# Patient Record
Sex: MALE | Race: BLACK OR AFRICAN AMERICAN | Employment: OTHER | URBAN - METROPOLITAN AREA
[De-identification: names, ages, dates, MRNs, and addresses within clinical notes are randomized per-mention and may not be internally consistent; named-entity substitution may affect disease eponyms.]

---

## 2021-05-07 ENCOUNTER — HOSPITAL ENCOUNTER (EMERGENCY)
Age: 71
Discharge: HOME | End: 2021-05-08
Attending: EMERGENCY MEDICINE
Payer: MEDICARE

## 2021-05-07 DIAGNOSIS — I10 ACCELERATED HYPERTENSION: ICD-10-CM

## 2021-05-07 DIAGNOSIS — E87.6 HYPOKALEMIA: Primary | ICD-10-CM

## 2021-05-07 LAB
ALBUMIN SERPL-MCNC: 3.8 G/DL (ref 3.4–5)
ALBUMIN/GLOB SERPL: 1.1 {RATIO} (ref 0.8–1.7)
ALP SERPL-CCNC: 95 U/L (ref 45–117)
ALT SERPL-CCNC: 48 U/L (ref 16–61)
ANION GAP SERPL CALC-SCNC: 5 MMOL/L (ref 3–18)
ANION GAP SERPL CALC-SCNC: 6 MMOL/L (ref 3–18)
AST SERPL-CCNC: 39 U/L (ref 10–38)
BASOPHILS # BLD: 0 K/UL (ref 0–0.1)
BASOPHILS NFR BLD: 1 % (ref 0–2)
BILIRUB SERPL-MCNC: 0.5 MG/DL (ref 0.2–1)
BUN SERPL-MCNC: 19 MG/DL (ref 7–18)
BUN SERPL-MCNC: 20 MG/DL (ref 7–18)
BUN/CREAT SERPL: 13 (ref 12–20)
BUN/CREAT SERPL: 15 (ref 12–20)
CALCIUM SERPL-MCNC: 9 MG/DL (ref 8.5–10.1)
CALCIUM SERPL-MCNC: 9.2 MG/DL (ref 8.5–10.1)
CHLORIDE SERPL-SCNC: 100 MMOL/L (ref 100–111)
CHLORIDE SERPL-SCNC: 100 MMOL/L (ref 100–111)
CO2 SERPL-SCNC: 32 MMOL/L (ref 21–32)
CO2 SERPL-SCNC: 35 MMOL/L (ref 21–32)
CREAT SERPL-MCNC: 1.3 MG/DL (ref 0.6–1.3)
CREAT SERPL-MCNC: 1.5 MG/DL (ref 0.6–1.3)
DIFFERENTIAL METHOD BLD: ABNORMAL
EOSINOPHIL # BLD: 0.1 K/UL (ref 0–0.4)
EOSINOPHIL NFR BLD: 4 % (ref 0–5)
ERYTHROCYTE [DISTWIDTH] IN BLOOD BY AUTOMATED COUNT: 13.2 % (ref 11.6–14.5)
GLOBULIN SER CALC-MCNC: 3.4 G/DL (ref 2–4)
GLUCOSE SERPL-MCNC: 134 MG/DL (ref 74–99)
GLUCOSE SERPL-MCNC: 213 MG/DL (ref 74–99)
HCT VFR BLD AUTO: 42.1 % (ref 36–48)
HGB BLD-MCNC: 13.9 G/DL (ref 13–16)
LYMPHOCYTES # BLD: 1.6 K/UL (ref 0.9–3.6)
LYMPHOCYTES NFR BLD: 52 % (ref 21–52)
MAGNESIUM SERPL-MCNC: 2.1 MG/DL (ref 1.6–2.6)
MCH RBC QN AUTO: 28.1 PG (ref 24–34)
MCHC RBC AUTO-ENTMCNC: 33 G/DL (ref 31–37)
MCV RBC AUTO: 85.1 FL (ref 74–97)
MONOCYTES # BLD: 0.4 K/UL (ref 0.05–1.2)
MONOCYTES NFR BLD: 14 % (ref 3–10)
NEUTS SEG # BLD: 0.9 K/UL (ref 1.8–8)
NEUTS SEG NFR BLD: 29 % (ref 40–73)
PLATELET # BLD AUTO: 122 K/UL (ref 135–420)
PMV BLD AUTO: 12.2 FL (ref 9.2–11.8)
POTASSIUM SERPL-SCNC: 2.6 MMOL/L (ref 3.5–5.5)
POTASSIUM SERPL-SCNC: 2.8 MMOL/L (ref 3.5–5.5)
PROT SERPL-MCNC: 7.2 G/DL (ref 6.4–8.2)
RBC # BLD AUTO: 4.95 M/UL (ref 4.35–5.65)
SODIUM SERPL-SCNC: 138 MMOL/L (ref 136–145)
SODIUM SERPL-SCNC: 140 MMOL/L (ref 136–145)
WBC # BLD AUTO: 3 K/UL (ref 4.6–13.2)

## 2021-05-07 PROCEDURE — 80053 COMPREHEN METABOLIC PANEL: CPT

## 2021-05-07 PROCEDURE — 74011250636 HC RX REV CODE- 250/636: Performed by: EMERGENCY MEDICINE

## 2021-05-07 PROCEDURE — 80048 BASIC METABOLIC PNL TOTAL CA: CPT

## 2021-05-07 PROCEDURE — 85025 COMPLETE CBC W/AUTO DIFF WBC: CPT

## 2021-05-07 PROCEDURE — 96365 THER/PROPH/DIAG IV INF INIT: CPT

## 2021-05-07 PROCEDURE — 83735 ASSAY OF MAGNESIUM: CPT

## 2021-05-07 PROCEDURE — 74011250637 HC RX REV CODE- 250/637: Performed by: EMERGENCY MEDICINE

## 2021-05-07 PROCEDURE — 99285 EMERGENCY DEPT VISIT HI MDM: CPT

## 2021-05-07 RX ORDER — CARVEDILOL 12.5 MG/1
TABLET ORAL 2 TIMES DAILY WITH MEALS
COMMUNITY

## 2021-05-07 RX ORDER — CHLORTHALIDONE 25 MG/1
TABLET ORAL DAILY
COMMUNITY
End: 2021-05-08

## 2021-05-07 RX ORDER — HYDRALAZINE HYDROCHLORIDE 10 MG/1
10 TABLET, FILM COATED ORAL
Status: COMPLETED | OUTPATIENT
Start: 2021-05-07 | End: 2021-05-07

## 2021-05-07 RX ORDER — LOSARTAN POTASSIUM 100 MG/1
100 TABLET ORAL DAILY
COMMUNITY

## 2021-05-07 RX ORDER — POTASSIUM CHLORIDE 20 MEQ/1
40 TABLET, EXTENDED RELEASE ORAL
Status: COMPLETED | OUTPATIENT
Start: 2021-05-07 | End: 2021-05-07

## 2021-05-07 RX ORDER — SIMVASTATIN 10 MG/1
TABLET, FILM COATED ORAL
COMMUNITY

## 2021-05-07 RX ORDER — GLIPIZIDE 5 MG/1
TABLET ORAL 2 TIMES DAILY
COMMUNITY

## 2021-05-07 RX ORDER — POTASSIUM CHLORIDE 750 MG/1
TABLET, FILM COATED, EXTENDED RELEASE ORAL
COMMUNITY

## 2021-05-07 RX ORDER — POTASSIUM CHLORIDE 20 MEQ/1
20 TABLET, EXTENDED RELEASE ORAL
Status: DISCONTINUED | OUTPATIENT
Start: 2021-05-07 | End: 2021-05-08

## 2021-05-07 RX ORDER — POTASSIUM CHLORIDE 7.45 MG/ML
10 INJECTION INTRAVENOUS
Status: COMPLETED | OUTPATIENT
Start: 2021-05-07 | End: 2021-05-07

## 2021-05-07 RX ADMIN — POTASSIUM CHLORIDE 10 MEQ: 10 INJECTION, SOLUTION INTRAVENOUS at 21:04

## 2021-05-07 RX ADMIN — POTASSIUM CHLORIDE 40 MEQ: 1500 TABLET, EXTENDED RELEASE ORAL at 21:03

## 2021-05-07 RX ADMIN — HYDRALAZINE HYDROCHLORIDE 10 MG: 10 TABLET, FILM COATED ORAL at 20:18

## 2021-05-07 NOTE — ED PROVIDER NOTES
[address]  EMERGENCY DEPARTMENT HISTORY AND PHYSICAL EXAM    7:32 PM    Date: 5/7/2021  Patient Name: Jessica Roy    History of Presenting Illness     Chief Complaint   Patient presents with    Abnormal Lab Results       History Provided By: Patient  Location/Duration/Severity/Modifying factors   77-year-old male presenting for abnormal labs. Patient reports he had routine labs drawn today which revealed a low potassium. He reports he had 2 tests done the initial test showed a potassium of 2.5. He went home took his oral potassium and he ate a banana he returns for repeat and his potassium is up to 2.7. He was then referred to our ER. Patient denies any symptoms really at all no numbness tingling weakness. He denies any specific issues like this in the past.  Did not have any particular symptoms when he went for the blood draw today. Reports he feels well no chest pain or shortness of breath would like to not be hospitalized. PCP: Lakeisha, MD Rodriguez    Current Outpatient Medications   Medication Sig Dispense Refill    hydrALAZINE (APRESOLINE) 25 mg tablet Take 1 Tab by mouth daily for 2 days. 2 Tab 0    losartan (COZAAR) 100 mg tablet Take 100 mg by mouth daily.  simvastatin (ZOCOR) 10 mg tablet Take  by mouth nightly.  potassium chloride SR (KLOR-CON 10) 10 mEq tablet Take  by mouth.  carvediloL (COREG) 12.5 mg tablet Take  by mouth two (2) times daily (with meals).  glipiZIDE (GLUCOTROL) 5 mg tablet Take  by mouth two (2) times a day.  SITagliptin (Januvia) 50 mg tablet Take 50 mg by mouth daily. Past History     Past Medical History:  Past Medical History:   Diagnosis Date    Diabetes ([address]     Hypertension        Past Surgical History:  History reviewed. No pertinent surgical history. Family History:  History reviewed. No pertinent family history.     Social History:  Social History     Tobacco Use    Smoking status: Never Smoker  Smokeless tobacco: Never Used   Substance Use Topics    Alcohol use: Yes    Drug use: Not on file       Allergies:  No Known Allergies    I reviewed and confirmed the above information with patient and updated as necessary. Review of Systems     Review of Systems   Constitutional: Negative for chills and fever. HENT: Negative for congestion, rhinorrhea, sinus pressure and sneezing. Eyes: Negative for visual disturbance. Respiratory: Negative for cough and shortness of breath. Cardiovascular: Negative for chest pain. Gastrointestinal: Negative for abdominal pain, diarrhea, nausea and vomiting. Genitourinary: Negative for dysuria, frequency and urgency. Musculoskeletal: Negative for back pain and neck pain. Skin: Negative for rash. Neurological: Negative for syncope, numbness and headaches. Physical Exam     Visit Vitals  BP (!) 191/146   Pulse (!) 58   Temp 98 °F (36.7 °C)   Resp 15   Ht 5' 10\" (1.778 m)   Wt 86.2 kg (190 lb)   SpO2 100%   BMI 27.26 kg/m²       Physical Exam  Constitutional:       General: He is not in acute distress. Appearance: Normal appearance. He is normal weight. He is not ill-appearing or toxic-appearing. HENT:      Head: Normocephalic and atraumatic. Right Ear: External ear normal.      Left Ear: External ear normal.      Nose: Nose normal.      Mouth/Throat:      Mouth: Mucous membranes are moist.      Pharynx: No oropharyngeal exudate or posterior oropharyngeal erythema. Eyes:      Conjunctiva/sclera: Conjunctivae normal.      Pupils: Pupils are equal, round, and reactive to light. Neck:      Musculoskeletal: Normal range of motion and neck supple. Cardiovascular:      Rate and Rhythm: Normal rate and regular rhythm. Pulses: Normal pulses. Heart sounds: Normal heart sounds. No murmur. No friction rub. Pulmonary:      Effort: Pulmonary effort is normal.      Breath sounds: Normal breath sounds. No wheezing, rhonchi or rales. Abdominal:      General: Abdomen is flat. Tenderness: There is no abdominal tenderness. There is no guarding or rebound. Musculoskeletal: Normal range of motion. General: No swelling or tenderness. Right lower leg: No edema. Left lower leg: No edema. Skin:     General: Skin is warm and dry. Capillary Refill: Capillary refill takes less than 2 seconds. Neurological:      General: No focal deficit present. Mental Status: He is alert. Motor: No weakness. Diagnostic Study Results     Labs -  Recent Results (from the past 24 hour(s))   CBC WITH AUTOMATED DIFF    Collection Time: 05/07/21  7:40 PM   Result Value Ref Range    WBC 3.0 (L) 4.6 - 13.2 K/uL    RBC 4.95 4.35 - 5.65 M/uL    HGB 13.9 13.0 - 16.0 g/dL    HCT 42.1 36.0 - 48.0 %    MCV 85.1 74.0 - 97.0 FL    MCH 28.1 24.0 - 34.0 PG    MCHC 33.0 31.0 - 37.0 g/dL    RDW 13.2 11.6 - 14.5 %    PLATELET 932 (L) 482 - 420 K/uL    MPV 12.2 (H) 9.2 - 11.8 FL    NEUTROPHILS 29 (L) 40 - 73 %    LYMPHOCYTES 52 21 - 52 %    MONOCYTES 14 (H) 3 - 10 %    EOSINOPHILS 4 0 - 5 %    BASOPHILS 1 0 - 2 %    ABS. NEUTROPHILS 0.9 (L) 1.8 - 8.0 K/UL    ABS. LYMPHOCYTES 1.6 0.9 - 3.6 K/UL    ABS. MONOCYTES 0.4 0.05 - 1.2 K/UL    ABS. EOSINOPHILS 0.1 0.0 - 0.4 K/UL    ABS.  BASOPHILS 0.0 0.0 - 0.1 K/UL    DF AUTOMATED     MAGNESIUM    Collection Time: 05/07/21  7:40 PM   Result Value Ref Range    Magnesium 2.1 1.6 - 2.6 mg/dL   METABOLIC PANEL, COMPREHENSIVE    Collection Time: 05/07/21  7:40 PM   Result Value Ref Range    Sodium 140 136 - 145 mmol/L    Potassium 2.8 (LL) 3.5 - 5.5 mmol/L    Chloride 100 100 - 111 mmol/L    CO2 35 (H) 21 - 32 mmol/L    Anion gap 5 3.0 - 18 mmol/L    Glucose 213 (H) 74 - 99 mg/dL    BUN 20 (H) 7.0 - 18 MG/DL    Creatinine 1.50 (H) 0.6 - 1.3 MG/DL    BUN/Creatinine ratio 13 12 - 20      GFR est AA 56 (L) >60 ml/min/1.73m2    GFR est non-AA 46 (L) >60 ml/min/1.73m2    Calcium 9.2 8.5 - 10.1 MG/DL Bilirubin, total 0.5 0.2 - 1.0 MG/DL    ALT (SGPT) 48 16 - 61 U/L    AST (SGOT) 39 (H) 10 - 38 U/L    Alk. phosphatase 95 45 - 117 U/L    Protein, total 7.2 6.4 - 8.2 g/dL    Albumin 3.8 3.4 - 5.0 g/dL    Globulin 3.4 2.0 - 4.0 g/dL    A-G Ratio 1.1 0.8 - 1.7     METABOLIC PANEL, BASIC    Collection Time: 05/07/21 10:10 PM   Result Value Ref Range    Sodium 138 136 - 145 mmol/L    Potassium 2.6 (LL) 3.5 - 5.5 mmol/L    Chloride 100 100 - 111 mmol/L    CO2 32 21 - 32 mmol/L    Anion gap 6 3.0 - 18 mmol/L    Glucose 134 (H) 74 - 99 mg/dL    BUN 19 (H) 7.0 - 18 MG/DL    Creatinine 1.30 0.6 - 1.3 MG/DL    BUN/Creatinine ratio 15 12 - 20      GFR est AA >60 >60 ml/min/1.73m2    GFR est non-AA 54 (L) >60 ml/min/1.73m2    Calcium 9.0 8.5 - 68.5 MG/DL   METABOLIC PANEL, BASIC    Collection Time: 05/07/21 11:00 PM   Result Value Ref Range    Sodium 139 136 - 145 mmol/L    Potassium 2.6 (LL) 3.5 - 5.5 mmol/L    Chloride 101 100 - 111 mmol/L    CO2 32 21 - 32 mmol/L    Anion gap 6 3.0 - 18 mmol/L    Glucose 124 (H) 74 - 99 mg/dL    BUN 20 (H) 7.0 - 18 MG/DL    Creatinine 1.37 (H) 0.6 - 1.3 MG/DL    BUN/Creatinine ratio 15 12 - 20      GFR est AA >60 >60 ml/min/1.73m2    GFR est non-AA 51 (L) >60 ml/min/1.73m2    Calcium 9.1 8.5 - 10.1 MG/DL         Radiologic Studies -   No orders to display           Medical Decision Making   I am the first provider for this patient. I reviewed the vital signs, available nursing notes, past medical history, past surgical history, family history and social history. Vital Signs-Reviewed the patient's vital signs.     Cardiac Monitor applied, my interpretation: NSR, no ectopy, rate of 55 00:25 AM    Pulse Oximetry Applied, my interpretation: 100% on Room air 00:25 AM            Records Reviewed: Nursing Notes, Old Medical Records, Previous Radiology Studies and Previous Laboratory Studies (Time of Review: 7:32 PM)    ED Course: Progress Notes, Reevaluation, and Consults:  ED Course as of May 08 0695   Sat May 08, 2021   0143 Discussed the case with the hospitalist on-call, Dr. Mague Light. Declines admission at this time. Recommends holding chlorthalidone and giving additional oral potassium and having patient follow-up outpatient as patient is asymptomatic    [NICKO]      ED Course User Index  [NICKO] Michael Lees,          Provider Notes (Medical Decision Making):   MDM  Number of Diagnoses or Management Options  Accelerated hypertension  Hypokalemia  Diagnosis management comments: Patient is a 72-year-old male who presents to the emergency department with a chief complaint of abnormal lab result. Differential includes hypokalemia, hypomagnesemia, cardiac arrhythmia, anemia, lab abnormality, inaccurate lab, etc.      I will get repeat labs repeat magnesium as well, will hopefully be able to replace this and send him home with a repeat several hours from now as he would prefer not to be hospitalized. Results reviewed: Potassium initially came back at 2.8, with 50 mEq of replacement came back at 2.6 repeated this as well was 2.6. Magnesium is normal.  Patient remains asymptomatic. I contacted the hospitalist on-call, Dr. Mague Light, for admission however she declined admit the patient at this time. Reports he is asymptomatic. Recommends discontinuing of the chlorthalidone and replacing with 40 more of the potassium. Suggested spironolactone for replacement however patient reports he had gynecomastia from this in the past and does not wish to take this again it was removed from his lumbar previously. For the interim I will put him on hydralazine at a low dose for a couple of days. I will have him follow-up closely with his primary care provider within the next 48 hours to find a more long-term solution for his blood pressure and have that potassium rechecked immediately within a couple of days.   He already takes 20 mEq a day I will have him take 2 of these for the next 2 days 1 in the morning 1 at night. He would rather go home than stay and have it rechecked again. He feels comfortable that he can get in touch with his PCP first thing Monday morning. Advised him to return if he develops any symptoms in the interim. He expresses understanding and in agreement with the plan and all questions have been answered. At this time, patient is stable and appropriate for discharge home.  Patient demonstrates understanding of current diagnoses and is in agreement with the treatment plan. Tyrel Brown are advised that while the likelihood of serious underlying condition is low at this point given the evaluation performed today, we cannot fully rule it out. Tyrel Brown are advised to immediately return with any new symptoms or worsening of current condition.  All questions have been answered. Екатерина Ochoa is given educational material regarding their diagnoses, including danger symptoms and when to return to the ED. This note was dictated utilizing Dragon voice recognition software. Unfortunately this leads to occasional typographical errors. I apologize in advance if the situation occurs. If questions occur please do not hesitate to contact me directly.,  Specifically palpitations numbness weakness or anything else that is concerning to him. Hemant Wilder, DO            Procedures    Critical Care Time: CRITICAL CARE NOTE:    I have spent 38 minutes of critical care time involved in lab review, consultations with specialist, family decision-making, and documentation. During this entire length of time I was immediately available to the patient. Critical Care: The reason for providing this level of medical care for this critically ill patient was due a critical illness that impaired one or more vital organ systems such that there was a high probability of imminent or life threatening deterioration in the patients condition.  This care involved high complexity decision making to assess, manipulate, and support vital system functions, to treat this vital organ system failure and to prevent further life threatening deterioration of the patients condition. Time is exclusive of procedural and teaching time. Mercy Muñoz DO      Diagnosis     Clinical Impression:   1. Hypokalemia    2. Accelerated hypertension        Disposition: Discharge    Follow-up Information     Follow up With Specialties Details Why [address]    [facility] EMERGENCY DE[facility] Emergency Medicine  As needed, If symptoms worsen [address] [id number]  [phone]    Your Primary Care Physician  In 2 days Call on Monday. Discharge Medication List as of 5/8/2021  1:53 AM      START taking these medications    Details   hydrALAZINE (APRESOLINE) 25 mg tablet Take 1 Tab by mouth daily for 2 days. , Print, Disp-2 Tab, R-0         CONTINUE these medications which have NOT CHANGED    Details   losartan (COZAAR) 100 mg tablet Take 100 mg by mouth daily. , Historical Med      simvastatin (ZOCOR) 10 mg tablet Take  by mouth nightly., Historical Med      potassium chloride SR (KLOR-CON 10) 10 mEq tablet Take  by mouth., Historical Med      carvediloL (COREG) 12.5 mg tablet Take  by mouth two (2) times daily (with meals). , Historical Med      glipiZIDE (GLUCOTROL) 5 mg tablet Take  by mouth two (2) times a day., Historical Med      SITagliptin (Januvia) 50 mg tablet Take 50 mg by mouth daily. , Historical Med         STOP taking these medications       chlorthalidone (HYGROTON) 25 mg tablet Comments:   Reason for Stopping:               Mercy Muñoz DO   Emergency Medicine   May 8, 2021, 7:32 PM     This note is dictated utilizing Dragon voice recognition software. Unfortunately this leads to occasional typographical errors using the voice recognition. I apologize in advance if the situation occurs. If questions occur please do not hesitate to contact me directly.     Mercy Muñoz DO

## 2021-05-08 VITALS
WEIGHT: 190 LBS | TEMPERATURE: 98 F | RESPIRATION RATE: 15 BRPM | BODY MASS INDEX: 27.2 KG/M2 | OXYGEN SATURATION: 100 % | SYSTOLIC BLOOD PRESSURE: 191 MMHG | DIASTOLIC BLOOD PRESSURE: 146 MMHG | HEART RATE: 58 BPM | HEIGHT: 70 IN

## 2021-05-08 LAB
ANION GAP SERPL CALC-SCNC: 6 MMOL/L (ref 3–18)
BUN SERPL-MCNC: 20 MG/DL (ref 7–18)
BUN/CREAT SERPL: 15 (ref 12–20)
CALCIUM SERPL-MCNC: 9.1 MG/DL (ref 8.5–10.1)
CHLORIDE SERPL-SCNC: 101 MMOL/L (ref 100–111)
CO2 SERPL-SCNC: 32 MMOL/L (ref 21–32)
CREAT SERPL-MCNC: 1.37 MG/DL (ref 0.6–1.3)
GLUCOSE SERPL-MCNC: 124 MG/DL (ref 74–99)
POTASSIUM SERPL-SCNC: 2.6 MMOL/L (ref 3.5–5.5)
SODIUM SERPL-SCNC: 139 MMOL/L (ref 136–145)

## 2021-05-08 PROCEDURE — 74011250637 HC RX REV CODE- 250/637: Performed by: EMERGENCY MEDICINE

## 2021-05-08 RX ORDER — POTASSIUM CHLORIDE 20 MEQ/1
20 TABLET, EXTENDED RELEASE ORAL
Status: DISCONTINUED | OUTPATIENT
Start: 2021-05-08 | End: 2021-05-08

## 2021-05-08 RX ORDER — SPIRONOLACTONE 25 MG/1
25 TABLET ORAL
Status: DISCONTINUED | OUTPATIENT
Start: 2021-05-08 | End: 2021-05-08

## 2021-05-08 RX ORDER — HYDRALAZINE HYDROCHLORIDE 25 MG/1
25 TABLET, FILM COATED ORAL DAILY
Qty: 2 TAB | Refills: 0 | Status: SHIPPED | OUTPATIENT
Start: 2021-05-08 | End: 2021-05-10

## 2021-05-08 RX ORDER — HYDRALAZINE HYDROCHLORIDE 25 MG/1
25 TABLET, FILM COATED ORAL
Status: COMPLETED | OUTPATIENT
Start: 2021-05-08 | End: 2021-05-08

## 2021-05-08 RX ORDER — POTASSIUM CHLORIDE 20 MEQ/1
40 TABLET, EXTENDED RELEASE ORAL
Status: COMPLETED | OUTPATIENT
Start: 2021-05-08 | End: 2021-05-08

## 2021-05-08 RX ADMIN — POTASSIUM CHLORIDE 40 MEQ: 1500 TABLET, EXTENDED RELEASE ORAL at 01:45

## 2021-05-08 RX ADMIN — HYDRALAZINE HYDROCHLORIDE 25 MG: 25 TABLET, FILM COATED ORAL at 01:45

## 2021-05-08 NOTE — DISCHARGE INSTRUCTIONS
1.  Call your primary care doctor first thing on Monday morning. You need to have a substitute for the chlorthalidone for your blood pressure. You may resume the other medications you are taking however you should take your potassium once in the morning and once at night. Keep yourself well-hydrated. 2.  Hold your chlorthalidone for the next 2 days. If your blood pressure becomes uncontrolled you should return. I am going to give you hydralazine to take as a substitution for the next 2 days. This is not a long-term solution however you need to see your primary care doctor in the meantime. 3.  If you develop any new or worsening symptoms you should return immediately.